# Patient Record
Sex: FEMALE | Race: WHITE | NOT HISPANIC OR LATINO
[De-identification: names, ages, dates, MRNs, and addresses within clinical notes are randomized per-mention and may not be internally consistent; named-entity substitution may affect disease eponyms.]

---

## 2019-01-17 ENCOUNTER — RESULT REVIEW (OUTPATIENT)
Age: 34
End: 2019-01-17

## 2019-01-24 ENCOUNTER — APPOINTMENT (OUTPATIENT)
Dept: ORTHOPEDIC SURGERY | Facility: CLINIC | Age: 34
End: 2019-01-24
Payer: COMMERCIAL

## 2019-01-24 VITALS — BODY MASS INDEX: 19.14 KG/M2 | HEIGHT: 62 IN | WEIGHT: 104 LBS

## 2019-01-24 DIAGNOSIS — Z82.61 FAMILY HISTORY OF ARTHRITIS: ICD-10-CM

## 2019-01-24 DIAGNOSIS — Z78.9 OTHER SPECIFIED HEALTH STATUS: ICD-10-CM

## 2019-01-24 PROCEDURE — 99203 OFFICE O/P NEW LOW 30 MIN: CPT

## 2019-01-24 PROCEDURE — 73564 X-RAY EXAM KNEE 4 OR MORE: CPT | Mod: LT

## 2019-01-24 RX ORDER — UBIDECARENONE 200 MG
500 CAPSULE ORAL
Refills: 0 | Status: ACTIVE | COMMUNITY

## 2019-02-07 ENCOUNTER — APPOINTMENT (OUTPATIENT)
Dept: ORTHOPEDIC SURGERY | Facility: CLINIC | Age: 34
End: 2019-02-07
Payer: COMMERCIAL

## 2019-02-07 VITALS — BODY MASS INDEX: 19.14 KG/M2 | HEIGHT: 62 IN | WEIGHT: 104 LBS

## 2019-02-07 DIAGNOSIS — M67.50 PLICA SYNDROME, UNSPECIFIED KNEE: ICD-10-CM

## 2019-02-07 DIAGNOSIS — M21.862 OTHER SPECIFIED ACQUIRED DEFORMITIES OF RIGHT LOWER LEG: ICD-10-CM

## 2019-02-07 DIAGNOSIS — M76.52 PATELLAR TENDINITIS, LEFT KNEE: ICD-10-CM

## 2019-02-07 DIAGNOSIS — M21.861 OTHER SPECIFIED ACQUIRED DEFORMITIES OF RIGHT LOWER LEG: ICD-10-CM

## 2019-02-07 DIAGNOSIS — Q74.2 OTHER CONGENITAL MALFORMATIONS OF LOWER LIMB(S), INCLUDING PELVIC GIRDLE: ICD-10-CM

## 2019-02-07 PROCEDURE — 99214 OFFICE O/P EST MOD 30 MIN: CPT

## 2020-06-24 ENCOUNTER — APPOINTMENT (OUTPATIENT)
Dept: ORTHOPEDIC SURGERY | Facility: CLINIC | Age: 35
End: 2020-06-24
Payer: COMMERCIAL

## 2020-06-24 DIAGNOSIS — M21.6X9 OTHER ACQUIRED DEFORMITIES OF UNSPECIFIED FOOT: ICD-10-CM

## 2020-06-24 DIAGNOSIS — G57.62 LESION OF PLANTAR NERVE, LEFT LOWER LIMB: ICD-10-CM

## 2020-06-24 PROCEDURE — 73630 X-RAY EXAM OF FOOT: CPT | Mod: LT

## 2020-06-24 PROCEDURE — 99214 OFFICE O/P EST MOD 30 MIN: CPT

## 2020-06-24 NOTE — HISTORY OF PRESENT ILLNESS
[de-identified] : 34 year old female presents complaining of intermittent but severe electricity-like pain in her toes. It has been going on for about 2 weeks. There is no history of injury. She did have bunion surgery on her right foot in the past. She does have a bunion on the left foot but it is not overly painful. She notes that when she experiences the shock feeling in her toes, it is severe and usually effects the 2nd-4th toes. It does not follow a certain pattern. She notes that it happens no matter which shoes she wears and even when she is lying down. She denies numbness in her feet. She denies pain in her ankle and has not noticed any swelling. Her lower back does not bother her but she has had some mild medial thigh discomfort that feels like a strain. Her left leg feels weaker than her right. She does swim for exercise but does not have pain when she swims.

## 2020-06-24 NOTE — DISCUSSION/SUMMARY
[de-identified] : I discussed the underlying pathophysiology of the patient's condition in great detail with the patient. I went over the patient's x-rays with them in great detail. We discussed the use of ice, Tylenol and anti-inflammatories to relieve pain. The patient was instructed in ROM exercises they are to do at home. At-home strengthening, and stretching exercises were discussed and demonstrated with the patient. We went over the wide ranging benefits of exercise for the patient health and I encouraged her to begin a diligent exercise program. I discussed ergonomic solutions with the patient such as wearing a shoe with a wide toe box and that provides arch support. She can also try wearing toe spacers. We are requesting authorization for an MRI of the patients left foot to r/o neuroma. All of her questions were answered. She understands and consents to the plan. \par \par FU after left foot MRI.\par

## 2020-07-13 ENCOUNTER — RESULT REVIEW (OUTPATIENT)
Age: 35
End: 2020-07-13

## 2021-11-18 ENCOUNTER — APPOINTMENT (OUTPATIENT)
Dept: ORTHOPEDIC SURGERY | Facility: CLINIC | Age: 36
End: 2021-11-18

## 2022-02-08 ENCOUNTER — FORM ENCOUNTER (OUTPATIENT)
Age: 37
End: 2022-02-08

## 2022-02-15 ENCOUNTER — FORM ENCOUNTER (OUTPATIENT)
Age: 37
End: 2022-02-15

## 2022-03-08 ENCOUNTER — FORM ENCOUNTER (OUTPATIENT)
Age: 37
End: 2022-03-08

## 2022-06-01 ENCOUNTER — APPOINTMENT (OUTPATIENT)
Dept: ORTHOPEDIC SURGERY | Facility: CLINIC | Age: 37
End: 2022-06-01

## 2022-06-02 ENCOUNTER — APPOINTMENT (OUTPATIENT)
Dept: ORTHOPEDIC SURGERY | Facility: CLINIC | Age: 37
End: 2022-06-02
Payer: COMMERCIAL

## 2022-06-02 VITALS — HEIGHT: 62 IN | BODY MASS INDEX: 19.69 KG/M2 | WEIGHT: 107 LBS

## 2022-06-02 DIAGNOSIS — S62.642A NONDISPLACED FRACTURE OF PROXIMAL PHALANX OF RIGHT MIDDLE FINGER, INITIAL ENCOUNTER FOR CLOSED FRACTURE: ICD-10-CM

## 2022-06-02 DIAGNOSIS — M79.644 PAIN IN RIGHT FINGER(S): ICD-10-CM

## 2022-06-02 PROCEDURE — 99213 OFFICE O/P EST LOW 20 MIN: CPT

## 2022-06-02 PROCEDURE — 73140 X-RAY EXAM OF FINGER(S): CPT

## 2022-06-02 RX ORDER — NITROFURANTOIN (MONOHYDRATE/MACROCRYSTALS) 25; 75 MG/1; MG/1
100 CAPSULE ORAL
Qty: 10 | Refills: 0 | Status: ACTIVE | COMMUNITY
Start: 2022-01-30

## 2022-06-02 RX ORDER — NYSTATIN AND TRIAMCINOLONE ACETONIDE 100000; 1 MG/G; MG/G
100000-0.1 CREAM TOPICAL
Qty: 15 | Refills: 0 | Status: ACTIVE | COMMUNITY
Start: 2021-12-28

## 2022-06-02 RX ORDER — METRONIDAZOLE 7.5 MG/G
0.75 GEL VAGINAL
Qty: 70 | Refills: 0 | Status: ACTIVE | COMMUNITY
Start: 2022-01-11

## 2022-06-02 RX ORDER — TINIDAZOLE 500 MG/1
500 TABLET, FILM COATED ORAL
Qty: 8 | Refills: 0 | Status: ACTIVE | COMMUNITY
Start: 2021-12-22

## 2022-06-02 NOTE — END OF VISIT
[FreeTextEntry3] : All medical record entries made by the Scribe were at my,  Dr. Vernon Mcneil MD., direction and personally dictated by me on 06/02/2022. I have personally reviewed the chart and agree that the record accurately reflects my personal performance of the history, physical exam, assessment and plan.

## 2022-06-02 NOTE — PHYSICAL EXAM
[de-identified] : Patient is WDWN, alert, and in no acute distress. Breathing is unlabored. She is grossly oriented to person, place, and time.\par \par She is accompanied by her mother today.\par \par Right Hand (Ring Finger):\par She presents in an alumifoam splint and stax splint adorned to the ring finger.\par There is diffuse swelling and bruising to the ring finger.\par Limitations of motion into flexion at DIP and PIP joints due to pain and injury\par Full flexion and extension to the MCP joint of the right ring finger.\par Sensation is intact distally to the digits.  [de-identified] : AP, lateral and oblique views of the right ring finger were obtained today and revealed an spiral fracture to the middle phalanx. The fracture is well aligned.

## 2022-06-02 NOTE — DISCUSSION/SUMMARY
[de-identified] : The underlying pathophysiology was reviewed with the patient. XR films were reviewed with the patient. Discussed at length the nature of the patient’s condition. The right ring finger symptoms appear secondary to spiral fracture of the middle phalanx.\par \par At this time, she was instructed on protection of the ring finger by buddy taping the finger to the neighboring digit. I recommended she soak the hand in warm water and Epsom salts as well as perform flexion and extension exercises of the digit. I advised her to take Motrin or Ibuprofen prior to performing the ROM exercises. She may also ice the hand for pain relief as needed but I told her at this stage it will not reduce edema at this stage. She was also provided with a splint and Coban to be used for added support and compression of the finger if needed.\par \par All questions answered, understanding verbalized. Patient in agreement with plan of care. Follow up in 3 weeks for repeat xrays.

## 2022-06-02 NOTE — HISTORY OF PRESENT ILLNESS
[de-identified] : Pt is a 35 y/o female with right ring finger injury.  She dropped something heavy on her finger on Saturday 5/28/22 and the finger was hyperextended.  She felt pain immediately.  The finger became swollen and ecchymotic.  She had a splint from a previous injury to this finger and she has been wearing that.  She has not had any xrays.  She has a ring on that finger that she is unable to remove because of the swelling.

## 2022-06-02 NOTE — ADDENDUM
[FreeTextEntry1] : I, Leticia Santana wrote this note acting as a scribe for Dr. Vernon Mcneil on Jun 02, 2022.

## 2022-06-23 ENCOUNTER — APPOINTMENT (OUTPATIENT)
Dept: ORTHOPEDIC SURGERY | Facility: CLINIC | Age: 37
End: 2022-06-23
Payer: COMMERCIAL

## 2022-06-23 PROCEDURE — 73140 X-RAY EXAM OF FINGER(S): CPT

## 2022-06-23 PROCEDURE — 99213 OFFICE O/P EST LOW 20 MIN: CPT

## 2022-06-24 NOTE — HISTORY OF PRESENT ILLNESS
[de-identified] : Pt is a 37 y/o female with right ring finger injury.  She dropped something heavy on her finger on Saturday 5/28/22 and the finger was hyperextended.  She felt pain immediately.  The finger became swollen and ecchymotic.  She had a splint from a previous injury to this finger and she has been wearing that.  \par She has been unable to remove her ring due to swelling

## 2022-06-24 NOTE — PHYSICAL EXAM
[de-identified] : Patient is WDWN, alert, and in no acute distress. Breathing is unlabored. She is grossly oriented to person, place, and time.\par \par Right Hand (Ring Finger):\par She presents in an alumifoam splint and stax splint adorned to the ring finger.\par Swelling has decreased\par Limitations of motion into flexion at DIP and PIP joints due to pain and injury\par Full flexion and extension to the MCP joint of the right ring finger.\par Pins and needles at finger tip [de-identified] : AP, lateral and oblique views of the right ring finger were obtained today and revealed an spiral fracture to the middle phalanx. The fracture is well aligned. \par Patient is wearing a ring in the xray due to inability to remove it because of swelling

## 2022-06-24 NOTE — DISCUSSION/SUMMARY
[de-identified] : The underlying pathophysiology was reviewed with the patient. XR films were reviewed with the patient. Discussed at length the nature of the patient’s condition. The right ring finger symptoms appear secondary to spiral fracture of the middle phalanx.\par \par All questions answered, understanding verbalized. Patient in agreement with plan of care. \par \par She is allowed to discontinue splint at this time \par She was instructed to work on finger flexion at home\par \par Might be 6 months to a year until she is completely pain free\par \par Follow up in 6 weeks for repeat xrays

## 2022-08-04 ENCOUNTER — APPOINTMENT (OUTPATIENT)
Dept: ORTHOPEDIC SURGERY | Facility: CLINIC | Age: 37
End: 2022-08-04

## 2022-08-04 ENCOUNTER — TRANSCRIPTION ENCOUNTER (OUTPATIENT)
Age: 37
End: 2022-08-04

## 2022-08-04 VITALS — BODY MASS INDEX: 19.51 KG/M2 | HEIGHT: 62 IN | WEIGHT: 106 LBS

## 2022-08-04 DIAGNOSIS — S62.609A FRACTURE OF UNSPECIFIED PHALANX OF UNSPECIFIED FINGER, INITIAL ENCOUNTER FOR CLOSED FRACTURE: ICD-10-CM

## 2022-08-04 PROCEDURE — 73140 X-RAY EXAM OF FINGER(S): CPT

## 2022-08-04 PROCEDURE — 99213 OFFICE O/P EST LOW 20 MIN: CPT

## 2022-08-04 NOTE — ADDENDUM
[FreeTextEntry1] : I, Leticia Santana wrote this note acting as a scribe for Dr. Vernon Mcneil on Aug 04, 2022.

## 2022-08-04 NOTE — DISCUSSION/SUMMARY
[de-identified] : The underlying pathophysiology was reviewed with the patient. XR films were reviewed with the patient. Discussed at length the nature of the patient’s condition. The right ring finger symptoms appear secondary to spiral fracture of the middle phalanx.\par \par I discussed with her that the appearance of the finger is due to the fracture healing and extra bone being deposited to heal the fracture. I discussed with her that the body heals in its own way and due to tendons and muscles pulling on the fracture fragments, there is always a risk of the fracture slightly displacing. But I wouldn't recommend surgical management currently not would I have recommended it in the past as we discussed as her overall function secondary to the injury would not be affected.  I told her that the only thing that can be done to change the appearance of the finger realistically is to surgical shave down the bone which I would not recommend currently. Conservatively however, I recommended buddy taping the fingers to hold the positioning of the fracture. We did discuss that the residual swelling will take time to resolve given the trauma to the finger but that this is normal. \par \par All questions answered, understanding verbalized. Patient in agreement with plan of care. Follow up as needed.

## 2022-08-04 NOTE — PHYSICAL EXAM
[de-identified] : Patient is WDWN, alert, and in no acute distress. Breathing is unlabored. She is grossly oriented to person, place, and time.\par \par Right Hand (Ring Finger):\par Swelling has decreased\par She has residual tenderness noted over the DIP joint of the ring finger. \par Limitations of motion into flexion at DIP and PIP joints due to pain and injury\par Full flexion and extension to the MCP joint of the right ring finger.\par Pins and needles at finger tip [de-identified] : AP, lateral and oblique views of the right ring finger were obtained today and revealed an spiral fracture to the middle phalanx. The fracture is well aligned and is healing. There is callous formation.

## 2022-08-04 NOTE — HISTORY OF PRESENT ILLNESS
[de-identified] : Pt is a 37 y/o female with right ring finger injury.  She dropped something heavy on her finger on Saturday 5/28/22 and the finger was hyperextended.  She felt pain immediately.  The finger became swollen and ecchymotic.  She had a splint from a previous injury to this finger and she has been wearing that. She was last seen in the office on 6/23/22 and she presents to the office today on 8/4/22 with improvements in mobility. She states however that she has continued pain and is concerned about the appearance of the finger as there is a bump that seems to be forming.

## 2022-08-04 NOTE — END OF VISIT
[FreeTextEntry3] : All medical record entries made by the Scribe were at my,  Dr. Vernon Mcneil MD., direction and personally dictated by me on 08/04/2022. I have personally reviewed the chart and agree that the record accurately reflects my personal performance of the history, physical exam, assessment and plan.

## 2023-03-31 ENCOUNTER — NON-APPOINTMENT (OUTPATIENT)
Age: 38
End: 2023-03-31

## 2023-04-26 ENCOUNTER — APPOINTMENT (OUTPATIENT)
Dept: BREAST CENTER | Facility: CLINIC | Age: 38
End: 2023-04-26

## 2024-06-21 ENCOUNTER — APPOINTMENT (OUTPATIENT)
Dept: ORTHOPEDIC SURGERY | Facility: CLINIC | Age: 39
End: 2024-06-21
Payer: COMMERCIAL

## 2024-06-21 VITALS — BODY MASS INDEX: 19.88 KG/M2 | HEIGHT: 62 IN | WEIGHT: 108 LBS

## 2024-06-21 DIAGNOSIS — Z00.00 ENCOUNTER FOR GENERAL ADULT MEDICAL EXAMINATION W/OUT ABNORMAL FINDINGS: ICD-10-CM

## 2024-06-21 DIAGNOSIS — M20.21 HALLUX RIGIDUS, RIGHT FOOT: ICD-10-CM

## 2024-06-21 PROCEDURE — 73630 X-RAY EXAM OF FOOT: CPT | Mod: RT

## 2024-06-21 PROCEDURE — 99204 OFFICE O/P NEW MOD 45 MIN: CPT | Mod: 57

## 2024-06-21 NOTE — DISCUSSION/SUMMARY
[Surgical risks reviewed] : Surgical risks reviewed [de-identified] : The risks, benefits, alternatives have been discussed.  The risks include but are not limited to infection, bleeding, injury to small nerves and blood vessels, pain, stiffness, progression, dvt, PE, amputation and death.

## 2024-06-21 NOTE — HISTORY OF PRESENT ILLNESS
[de-identified] : 6/21/24  R hallux mtp pain and stiffness.  Ice/Motrin  Hx of bunionectomy age 18 [] : Post Surgical Visit: no [FreeTextEntry1] : R foot  [FreeTextEntry5] : pt states she started getting pain 2 weeks ago. denies any injury. wb in reg shoes. no tx to date.

## 2024-06-21 NOTE — PHYSICAL EXAM
[Right] : right foot and ankle [Mild] : mild swelling of MTP joint/great toe [1st] : 1st [2+] : dorsalis pedis pulse: 2+ [] : mildly antalgic [FreeTextEntry9] : hallux mtp df 50

## 2024-06-21 NOTE — IMAGING
[Right] : right foot [Weight -] : weightbearing [Degenerative change] : Degenerative change [de-identified] : hallux mtp

## 2024-11-14 ENCOUNTER — APPOINTMENT (OUTPATIENT)
Dept: ORTHOPEDIC SURGERY | Facility: CLINIC | Age: 39
End: 2024-11-14
Payer: COMMERCIAL

## 2024-11-14 VITALS — BODY MASS INDEX: 19.88 KG/M2 | WEIGHT: 108 LBS | HEIGHT: 62 IN

## 2024-11-14 DIAGNOSIS — M79.644 PAIN IN RIGHT FINGER(S): ICD-10-CM

## 2024-11-14 PROCEDURE — 73140 X-RAY EXAM OF FINGER(S): CPT

## 2024-11-14 PROCEDURE — 99213 OFFICE O/P EST LOW 20 MIN: CPT

## 2025-02-05 ENCOUNTER — APPOINTMENT (OUTPATIENT)
Dept: ORTHOPEDIC SURGERY | Facility: CLINIC | Age: 40
End: 2025-02-05
Payer: COMMERCIAL

## 2025-02-05 DIAGNOSIS — S93.602A UNSPECIFIED SPRAIN OF LEFT FOOT, INITIAL ENCOUNTER: ICD-10-CM

## 2025-02-05 DIAGNOSIS — M79.672 PAIN IN LEFT FOOT: ICD-10-CM

## 2025-02-05 PROCEDURE — 73630 X-RAY EXAM OF FOOT: CPT | Mod: LT

## 2025-03-27 ENCOUNTER — APPOINTMENT (OUTPATIENT)
Dept: GASTROENTEROLOGY | Facility: CLINIC | Age: 40
End: 2025-03-27

## 2025-04-02 ENCOUNTER — NON-APPOINTMENT (OUTPATIENT)
Age: 40
End: 2025-04-02

## 2025-04-02 ENCOUNTER — APPOINTMENT (OUTPATIENT)
Dept: GASTROENTEROLOGY | Facility: CLINIC | Age: 40
End: 2025-04-02
Payer: COMMERCIAL

## 2025-04-02 VITALS
BODY MASS INDEX: 18.86 KG/M2 | HEIGHT: 62 IN | HEART RATE: 90 BPM | RESPIRATION RATE: 16 BRPM | TEMPERATURE: 98 F | OXYGEN SATURATION: 99 % | SYSTOLIC BLOOD PRESSURE: 114 MMHG | WEIGHT: 102.5 LBS | DIASTOLIC BLOOD PRESSURE: 80 MMHG

## 2025-04-02 DIAGNOSIS — K59.09 OTHER CONSTIPATION: ICD-10-CM

## 2025-04-02 DIAGNOSIS — R14.0 ABDOMINAL DISTENSION (GASEOUS): ICD-10-CM

## 2025-04-02 DIAGNOSIS — K64.9 UNSPECIFIED HEMORRHOIDS: ICD-10-CM

## 2025-04-02 PROCEDURE — 99203 OFFICE O/P NEW LOW 30 MIN: CPT

## 2025-04-02 RX ORDER — HYDROCORTISONE 2.5% 25 MG/G
2.5 CREAM TOPICAL
Qty: 60 | Refills: 4 | Status: ACTIVE | COMMUNITY
Start: 2025-04-02 | End: 1900-01-01

## 2025-04-15 ENCOUNTER — TRANSCRIPTION ENCOUNTER (OUTPATIENT)
Age: 40
End: 2025-04-15

## 2025-05-19 ENCOUNTER — APPOINTMENT (OUTPATIENT)
Dept: GASTROENTEROLOGY | Facility: CLINIC | Age: 40
End: 2025-05-19